# Patient Record
Sex: FEMALE | Race: WHITE
[De-identification: names, ages, dates, MRNs, and addresses within clinical notes are randomized per-mention and may not be internally consistent; named-entity substitution may affect disease eponyms.]

---

## 2019-12-25 ENCOUNTER — HOSPITAL ENCOUNTER (EMERGENCY)
Dept: HOSPITAL 59 - ER | Age: 59
Discharge: HOME | End: 2019-12-25
Payer: COMMERCIAL

## 2019-12-25 DIAGNOSIS — J10.1: Primary | ICD-10-CM

## 2019-12-25 LAB
FLUBV AG SPEC QL IA: POSITIVE
LEAD BLD-MCNC: NEGATIVE UG/DL

## 2019-12-25 PROCEDURE — 99282 EMERGENCY DEPT VISIT SF MDM: CPT

## 2019-12-25 PROCEDURE — 87400 INFLUENZA A/B EACH AG IA: CPT

## 2019-12-25 NOTE — EMERGENCY DEPARTMENT RECORD
History of Present Illness





- General


Chief Complaint: Cough


Stated Complaint: COUGH


Time Seen by Provider: 19 16:23


Source: Patient, RN notes reviewed


Mode of Arrival: Ambulatory





- History of Present Illness


Initial Comments: 


2 days of congestion and cough and  has the same thing. 





Onset/Timin


-: Month(s)





- Related Data


                                Home Medications











 Medication  Instructions  Recorded  Confirmed  Last Taken


 


Levothyroxine Sodium 75 mcg PO DAILY 19 Unknown


 


Metoprolol Tartrate 25 mg PO DAILY 19 Unknown











                                    Allergies











Allergy/AdvReac Type Severity Reaction Status Date / Time


 


Penicillins Allergy  HIVES Verified 19 16:27














Travel Screening





- Travel/Exposure Within Last 30 Days


Have you traveled within the last 30 days?: No





Review of Systems


Reviewed: No additional complaints except as noted below


Constitutional: Reports: As per HPI.  Denies: Chills, Fever, Malaise, Night swea

ts, Weakness, Weight change


Eyes: Reports: As per HPI.  Denies: Eye discharge, Eye pain, Photophobia, Vision

change


ENT: Reports: As per HPI, Congestion.  Denies: Dental pain, Ear pain, Epistaxis,

Hearing loss, Throat pain


Respiratory: Reports: As per HPI, Cough.  Denies: Dyspnea, Hemoptysis, Stridor, 

Wheezes


Cardiovascular: Reports: As per HPI.  Denies: Arrhythmia, Chest pain, Dyspnea on

exertion, Edema, Murmurs, Orthopnea, Palpitations, Paroxysmal nocturnal dyspnea,

Rheumatic Fever, Syncope


Endocrine: Reports: As per HPI.  Denies: Fatigue, Heat or cold intolerance, 

Polydipsia, Polyuria


Gastrointestinal: Reports: As per HPI.  Denies: Abdominal pain, Constipation, 

Diarrhea, Hematemesis, Hematochezia, Melena, Nausea, Vomiting


Genitourinary: Reports: As per HPI.  Denies: Abnormal menses, Discharge, 

Dyspareunia, Dysuria, Frequency, Hematuria, Incontinence, Retention, Urgency


Musculoskeletal: Reports: As per HPI.  Denies: Arthralgia, Back pain, Gout, 

Joint swelling, Myalgia, Neck pain


Skin: Reports: As per HPI.  Denies: Bruising, Change in color, Change in 

hair/nails, Lesions, Pruritus, Rash


Neurological: Reports: As per HPI.  Denies: Abnormal gait, Confusion, Headache, 

Numbness, Paresthesias, Seizure, Tingling, Tremors, Vertigo, Weakness


Psychiatric: Reports: As per HPI.  Denies: Anxiety, Auditory hallucinations, 

Depression, Homicidal thoughts, Suicidal thoughts, Visual hallucinations


Hematological/Lymphatic: Reports: As per HPI.  Denies: Anemia, Blood Clots, Easy

bleeding, Easy bruising, Swollen glands





Past Medical History





- SOCIAL HISTORY


Smoking Status: Never smoker


Alcohol Use: None


Drug Use: None





- RESPIRATORY


Hx Respiratory Disorders: No





- CARDIOVASCULAR


Hx Cardio Disorders: Yes


Hx Hypertension: Yes





- NEURO


Hx Neuro Disorders: No





- GI


Hx GI Disorders: No





- 


Hx Genitourinary Disorders: Yes


Hx Renal Disease: Yes (only has left kidney)





- ENDOCRINE


Hx Endocrine Disorders: Yes


Hx Thyroid Disease: Yes





- MUSCULOSKELETAL


Hx Musculoskeletal Disorders: No





- PSYCH


Hx Psych Problems: No





- HEMATOLOGY/ONCOLOGY


Hx Hematology/Oncology Disorders: Yes


Hx Cancer: Yes (endometrial)





Family Medical History


Any Significant Family History?: No





Physical Exam





- General


General Appearance: Alert, Oriented x3, Cooperative, No acute distress





- Head


Head exam: Normal inspection





- Eye


Eye exam: Normal appearance, PERRL


Pupils: Normal accommodation





- ENT


ENT exam: Normal exam, Mucous membranes moist, Normal external ear exam, Normal 

orophraynx, TM's normal bilaterally


Ear exam: Normal external inspection.  negative: External canal tenderness


Nasal Exam: Normal inspection.  negative: Discharge, Sinus tenderness


Mouth exam: Normal external inspection, Tongue normal


Teeth exam: Normal inspection.  negative: Dental caries


Throat exam: Normal inspection.  negative: Tonsillar erythema, Tonsillar exudate





- Neck


Neck exam: Normal inspection, Full ROM.  negative: Tenderness





- Respiratory


Respiratory exam: Normal lung sounds bilaterally.  negative: Respiratory 

distress





- Cardiovascular


Cardiovascular Exam: Regular rate, Normal rhythm, Normal heart sounds





- GI/Abdominal


GI/Abdominal exam: Soft, Normal bowel sounds.  negative: Tenderness





- Rectal


Rectal exam: Deferred





- 


 exam: Deferred





- Extremities


Extremities exam: Normal inspection, Full ROM, Normal capillary refill.  

negative: Tenderness





- Back


Back exam: Reports: Normal inspection, Full ROM.  Denies: Muscle spasm, Rash 

noted, Tenderness





- Neurological


Neurological exam: Alert, Normal gait, Oriented X3, Reflexes normal





- Psychiatric


Psychiatric exam: Normal affect, Normal mood





- Skin


Skin exam: Dry, Intact, Normal color, Warm





Course





                                   Vital Signs











  19





  16:29


 


Temperature 98.6 F


 


Pulse Rate 103 H


 


Respiratory 20





Rate 


 


Blood Pressure 127/82


 


Pulse Ox 95














- Reevaluation(s)


Reevaluation #1: 


discussed influenza treatment and they did not want tamaflu


19 17:37








Disposition


Clinical Impression: 


 Viral syndrome, Influenza B





Disposition: Home, Self-Care


Condition: (1) Good


Instructions:  Influenza (ED), Cold Symptoms (ED)


Additional Instructions: 


follow up with family DR in 7 days


tylenol or motrin


Forms:  Patient Portal Access


Time of Disposition: 16:49





Quality





- Quality Measures


Quality Measures: N/A





- Blood Pressure Screening


Does Patient Have Any of the Following: No


Blood Pressure Classification: Pre-Hypertensive BP Reading


Systolic Measurement: 127


Diastolic Measurement: 82


Screening for High Blood Pressure: < Pre-Hypertensive BP, F/U Documented > 

[]


Pre-Hypertensive Follow-up Interventions: Referral to alternative/primary care 

provider.